# Patient Record
Sex: FEMALE | Employment: FULL TIME | URBAN - METROPOLITAN AREA
[De-identification: names, ages, dates, MRNs, and addresses within clinical notes are randomized per-mention and may not be internally consistent; named-entity substitution may affect disease eponyms.]

---

## 2023-10-27 PROBLEM — R87.610 PAP SMEAR ABNORMALITY OF CERVIX WITH ASCUS FAVORING BENIGN: Status: ACTIVE | Noted: 2023-10-27

## 2023-10-27 PROBLEM — N39.3 STRESS INCONTINENCE IN FEMALE: Status: ACTIVE | Noted: 2023-10-27

## 2023-10-27 PROBLEM — N76.0 VAGINITIS: Status: ACTIVE | Noted: 2023-10-27

## 2023-10-27 PROBLEM — N81.89 PELVIC FLOOR WEAKNESS: Status: ACTIVE | Noted: 2023-10-27

## 2023-10-27 PROBLEM — N93.9 ABNORMAL UTERINE BLEEDING: Status: ACTIVE | Noted: 2023-10-27

## 2023-10-27 PROBLEM — T38.4X5A: Status: ACTIVE | Noted: 2023-10-27

## 2023-10-27 PROBLEM — B36.0 PITYRIASIS VERSICOLOR: Status: ACTIVE | Noted: 2019-05-22

## 2023-10-27 PROBLEM — R10.2 PELVIC PAIN: Status: ACTIVE | Noted: 2023-10-27

## 2023-10-27 PROBLEM — O20.0 THREATENED ABORTION (HHS-HCC): Status: ACTIVE | Noted: 2023-10-27

## 2023-10-27 PROBLEM — R39.15 URINARY URGENCY: Status: ACTIVE | Noted: 2023-10-27

## 2023-10-27 PROBLEM — N92.0 HEAVY MENSTRUAL PERIOD: Status: ACTIVE | Noted: 2023-10-27

## 2023-10-27 RX ORDER — OXYBUTYNIN CHLORIDE 10 MG/1
1 TABLET, EXTENDED RELEASE ORAL 2 TIMES DAILY
COMMUNITY
Start: 2019-05-28 | End: 2023-10-30 | Stop reason: ALTCHOICE

## 2023-10-27 RX ORDER — KETOCONAZOLE 20 MG/ML
SHAMPOO, SUSPENSION TOPICAL
COMMUNITY
Start: 2019-01-17 | End: 2024-02-02

## 2023-10-27 RX ORDER — FLUCONAZOLE 150 MG/1
TABLET ORAL
COMMUNITY
Start: 2019-01-17 | End: 2024-02-02

## 2023-10-27 RX ORDER — PRENATAL WITH FERROUS FUM AND FOLIC ACID 3080; 920; 120; 400; 22; 1.84; 3; 20; 10; 1; 12; 200; 27; 25; 2 [IU]/1; [IU]/1; MG/1; [IU]/1; MG/1; MG/1; MG/1; MG/1; MG/1; MG/1; UG/1; MG/1; MG/1; MG/1; MG/1
1 TABLET ORAL DAILY
COMMUNITY
Start: 2022-10-24 | End: 2024-02-02

## 2023-10-27 RX ORDER — ONDANSETRON 4 MG/1
4 TABLET, FILM COATED ORAL 4 TIMES DAILY PRN
COMMUNITY
End: 2024-02-02

## 2023-10-29 NOTE — PROGRESS NOTES
"Subjective   Patient ID: Kristina Benitez is a 33 y.o. female who presents for stress urinary incontinence and worsening urinary frequency.    HPI   33-year-old presenting after mid urethral sling and cystoscopy 11/22/19 with a history of mixed urinary incontinence, pelvic floor weakness and pain with urinary urgency.    The patient was last seen in 2021, she presents with recurrence of her stress urinary incontinence. She has not been utilizing the Oxybutynin therapy after her miscarriages for the past year. She does note frequency and incontinence particularly after she stands up after voided. She voids every 1-2 hour during the day and notes at least once during the night. She also note leaking on laughing, coughing and sneezing. She denies any UTI like symptoms.    She denies any vaginal complaints, no abnormal bleeding or discharge.  She denies any dyspareunia complaints.    She denies any bowel related complaints, no fecal or flatal incontinence.    She has no other complaints.      From Previous note  31-year-old presenting for follow-up after mid urethral sling and cystoscopy 11/22/19.     Patient is extremely satisfied with her mid urethral sling. She denies any stress urinary incontinence complaints. She denies any urinary urgency. She has been sexually active and denies any dyspareunia. She does feel a \"bump\" where the previous incision was but denies any roughness, vaginal bleeding, or discomfort with her or her .     She has utilized her oxybutynin with improvements in her urinary urgency and frequency complaints. She does feel that when she takes 2 of her 10 mg tablets she has improved benefit. She wishes to increase her medication to 20 mg daily. She otherwise denies any constipation or dry mouth complaints.     She otherwise has no other complaints..        From previous note  29-year-old presenting as a referral from Dr. Dahl with complaints of mixed urinary incontinence.     Patient states " "that she has had at least a 5 year history of urinary complaints. She notes leaking with laughing, coughing, and sneezing both with a full an empty bladder. She states that she leaks \"splashes\". She also notes daytime urgency and frequency. She notes urinating every hour during the daytime and rare urge related incontinence with this. She notes nocturia 1-2 times and denies enuresis. She denies a history of nephrolithiasis, gross hematuria, or chronic urinary tract infections.     The patient is sexually active. She has the Nexplanon in place, which was placed in March 2019. She does wish to have more children. She denies any dyspareunia. She denies any abnormal vaginal bleeding or spotting. She denies any fecal or flatal incontinence. She has regular bowel movements.     She has utilized Kegel exercises in the past but is otherwise not been seen regarding these complaints.     She has no other complaints.     Review of Systems  Constitutional: No fever, No chills and No fatigue.   Eyes: No vision problems and No dryness of the eyes.   ENT: No dry mouth, No hearing loss and No nosebleeds.   Cardiovascular: No chest pain, No palpitations and No orthopnea.   Respiratory: No shortness of breath, No cough and No wheezing.   Gastrointestinal: No abdominal pain, No constipation, No nausea, No diarrhea, No vomiting and No melena.   Genitourinary: As noted in HPI.   Musculoskeletal: No back pain, No myalgias, No muscle weakness, No joint swelling and No leg edema.   Integumentary: No rashes, No skin lesion and No itching.   Neurological: No headache, No numbness and No dizziness.   Psychiatric: No sleep disturbances, No anxiety and No depression.   Endocrine: No hot flashes, No loss of hair and No hirsutism.   Hematologic/Lymphatic: No swollen glands, No tendency for easy bleeding and No tendency for easy bruising.   All other systems have been reviewed and are negative for complaint.        Objective   Physical " Exam    PHYSICAL EXAMINATION:  No LMP recorded.  There is no height or weight on file to calculate BMI.  There were no vitals taken for this visit.  General Appearance: well appearing  Neuro: Alert and oriented   HEENT: mucous membranes moist, neck supple  Resp: No respiratory distress, normal work of breathing  MSK: normal range of motion, gait appropriate      Assessment/Plan      33-year-old presenting after mid urethral sling and cystoscopy 11/22/19 with a history of mixed urinary incontinence, pelvic floor weakness and pain with urinary urgency.     #1 the patient is having excellent results with her mid urethral sling.  She denies any vaginal complaints.    #2 the patient had initially had excellent results with oxybutynin 20 mg daily.  However she has stopped this due to concerns for miscarriage.  We discussed a trial of Myrbetriq therapy now.  We discussed potential side effects of this and stopping immediately should you have any bothersome side effects.  We discussed proceeding with urodynamic testing thereafter should she not have resolution of her lower urinary tract symptoms.     #3 the patient will follow-up in 4 to 6 weeks to discuss her lower urinary tract symptoms.     RON Fuchs MD     Scribe Attestation  By signing my name below, IJeana Scribe attest that this documentation has been prepared under the direction and in the presence of Matteo Fuchs MD. All medical record entries made by the Scribe were at my direction or personally dictated by me. I have reviewed the chart and agree that the record accurately reflects my personal performance of the history, physical exam, discussion and plan.

## 2023-10-30 ENCOUNTER — OFFICE VISIT (OUTPATIENT)
Dept: UROLOGY | Facility: CLINIC | Age: 33
End: 2023-10-30
Payer: COMMERCIAL

## 2023-10-30 DIAGNOSIS — N39.41 URGE URINARY INCONTINENCE: Primary | ICD-10-CM

## 2023-10-30 DIAGNOSIS — N81.89 PELVIC FLOOR WEAKNESS: ICD-10-CM

## 2023-10-30 DIAGNOSIS — R39.15 URINARY URGENCY: ICD-10-CM

## 2023-10-30 DIAGNOSIS — N39.3 STRESS INCONTINENCE IN FEMALE: ICD-10-CM

## 2023-10-30 DIAGNOSIS — R10.2 PELVIC PAIN: ICD-10-CM

## 2023-10-30 LAB
POC APPEARANCE, URINE: CLEAR
POC BILIRUBIN, URINE: NEGATIVE
POC BLOOD, URINE: ABNORMAL
POC COLOR, URINE: YELLOW
POC GLUCOSE, URINE: NEGATIVE MG/DL
POC KETONES, URINE: NEGATIVE MG/DL
POC LEUKOCYTES, URINE: ABNORMAL
POC NITRITE,URINE: NEGATIVE
POC PH, URINE: 5.5 PH
POC PROTEIN, URINE: NEGATIVE MG/DL
POC SPECIFIC GRAVITY, URINE: 1.01
POC UROBILINOGEN, URINE: 0.2 EU/DL

## 2023-10-30 PROCEDURE — 99213 OFFICE O/P EST LOW 20 MIN: CPT | Performed by: OBSTETRICS & GYNECOLOGY

## 2023-10-30 PROCEDURE — 81003 URINALYSIS AUTO W/O SCOPE: CPT | Mod: QW | Performed by: OBSTETRICS & GYNECOLOGY

## 2023-10-30 PROCEDURE — 1036F TOBACCO NON-USER: CPT | Performed by: OBSTETRICS & GYNECOLOGY

## 2023-10-30 RX ORDER — MIRABEGRON 25 MG/1
25 TABLET, FILM COATED, EXTENDED RELEASE ORAL DAILY
Qty: 30 TABLET | Refills: 11 | Status: SHIPPED | OUTPATIENT
Start: 2023-10-30 | End: 2023-11-27 | Stop reason: SDUPTHER

## 2024-02-02 ENCOUNTER — OFFICE VISIT (OUTPATIENT)
Dept: OBSTETRICS AND GYNECOLOGY | Facility: CLINIC | Age: 34
End: 2024-02-02
Payer: COMMERCIAL

## 2024-02-02 VITALS
BODY MASS INDEX: 27.94 KG/M2 | SYSTOLIC BLOOD PRESSURE: 119 MMHG | DIASTOLIC BLOOD PRESSURE: 76 MMHG | WEIGHT: 148 LBS | HEIGHT: 61 IN

## 2024-02-02 DIAGNOSIS — Z01.419 ENCOUNTER FOR ANNUAL ROUTINE GYNECOLOGICAL EXAMINATION: Primary | ICD-10-CM

## 2024-02-02 DIAGNOSIS — N96 HISTORY OF RECURRENT MISCARRIAGES: ICD-10-CM

## 2024-02-02 LAB
POC BLOOD, URINE: NEGATIVE
POC GLUCOSE, URINE: NEGATIVE MG/DL
POC KETONES, URINE: NEGATIVE MG/DL
POC LEUKOCYTES, URINE: NEGATIVE
POC NITRITE,URINE: NEGATIVE
POC PROTEIN, URINE: NEGATIVE MG/DL

## 2024-02-02 PROCEDURE — 1036F TOBACCO NON-USER: CPT | Performed by: OBSTETRICS & GYNECOLOGY

## 2024-02-02 PROCEDURE — 99395 PREV VISIT EST AGE 18-39: CPT | Performed by: OBSTETRICS & GYNECOLOGY

## 2024-02-02 PROCEDURE — 81003 URINALYSIS AUTO W/O SCOPE: CPT | Mod: QW,91 | Performed by: OBSTETRICS & GYNECOLOGY

## 2024-02-02 PROCEDURE — 88175 CYTOPATH C/V AUTO FLUID REDO: CPT | Mod: TC,GCY,AHULAB,PORLAB | Performed by: OBSTETRICS & GYNECOLOGY

## 2024-02-02 PROCEDURE — 87624 HPV HI-RISK TYP POOLED RSLT: CPT | Performed by: OBSTETRICS & GYNECOLOGY

## 2024-02-02 ASSESSMENT — LIFESTYLE VARIABLES
HOW MANY STANDARD DRINKS CONTAINING ALCOHOL DO YOU HAVE ON A TYPICAL DAY: PATIENT DOES NOT DRINK
HOW OFTEN DO YOU HAVE A DRINK CONTAINING ALCOHOL: NEVER
HOW OFTEN DO YOU HAVE SIX OR MORE DRINKS ON ONE OCCASION: NEVER
AUDIT-C TOTAL SCORE: 0
SKIP TO QUESTIONS 9-10: 1

## 2024-02-02 ASSESSMENT — ENCOUNTER SYMPTOMS
LOSS OF SENSATION IN FEET: 0
PSYCHIATRIC NEGATIVE: 0
GASTROINTESTINAL NEGATIVE: 0
ENDOCRINE NEGATIVE: 0
MUSCULOSKELETAL NEGATIVE: 0
ALLERGIC/IMMUNOLOGIC NEGATIVE: 0
RESPIRATORY NEGATIVE: 0
HEMATOLOGIC/LYMPHATIC NEGATIVE: 0
OCCASIONAL FEELINGS OF UNSTEADINESS: 0
EYES NEGATIVE: 0
CARDIOVASCULAR NEGATIVE: 0
CONSTITUTIONAL NEGATIVE: 0
NEUROLOGICAL NEGATIVE: 0
DEPRESSION: 0

## 2024-02-02 ASSESSMENT — PAIN SCALES - GENERAL: PAINLEVEL: 0-NO PAIN

## 2024-02-02 ASSESSMENT — PATIENT HEALTH QUESTIONNAIRE - PHQ9
2. FEELING DOWN, DEPRESSED OR HOPELESS: NOT AT ALL
1. LITTLE INTEREST OR PLEASURE IN DOING THINGS: NOT AT ALL
SUM OF ALL RESPONSES TO PHQ9 QUESTIONS 1 & 2: 0

## 2024-02-02 NOTE — PROGRESS NOTES
Subjective   Patient ID: Kristina Benitez is a 34 y.o. female who presents for Annual Exam (Annual exam/wants to discuss miscarriages last pap 20 [facility] no chaperone needed).  MARCE Acevedo is a 34-year-old female  12 para 2 with 2 previous vaginal deliveries.  1 at 36 weeks and 1 at 38 weeks.  The second 1 with a cerclage due to suspected cervical incompetence.  She has had 1  in 2023.  Suction D&C  She states that she has multiple pregnancies with positive urine pregnancy test prior to her expected menses.  She feels as though she may have been pregnant 6 or 8 times in the last year.  All with vaginal bleeding coming is essentially on time of her menses.  She has urinary urgency and occasional incontinence.  Treated with Myrbetriq.  Had a previous urethral sling.    Last menstrual period was  and is expecting ovulation in the next 3 to 4 days.  Review of Systems   All other systems reviewed and are negative.      Objective   Physical Exam  Thyroid: No thyroid megaly    Cardiovascular: Regular rate and rhythm    Lungs: Clear to auscultation    Breasts: No skin changes no masses palpated    Abdomen: Soft nontender bowel sounds positive no masses palpated    Extremities nontender no edema    Pelvic exam: External genitalia Bartholin's urethra and [place]'s are normal.  Vaginal exam shows no lesions or discharge.  Pelvic bimanual exam reveals no masses or tenderness.  Assessment/Plan   Normal annual physical exam.  Cervix does feel patulous.  Consistent with her previous diagnosis of cervical incompetence.  Urinary urgency improved with Myrbetriq    Patient mostly concerned about recurrent miscarriages.  Positive urine pregnancy test with menses shortly thereafter.  I have ordered hCG, TSH, prolactin, and FSH.  I have asked her to get these drawn on the day of her positive urine pregnancy test.  Also ordered referral to reproductive endocrinology.  For recurrent miscarriage.    Pap  smear performed         Ernesto Dahl MD 02/02/24 11:19 AM

## 2024-05-28 ENCOUNTER — APPOINTMENT (OUTPATIENT)
Dept: UROLOGY | Facility: CLINIC | Age: 34
End: 2024-05-28
Payer: COMMERCIAL

## 2024-06-25 ENCOUNTER — APPOINTMENT (OUTPATIENT)
Dept: UROLOGY | Facility: CLINIC | Age: 34
End: 2024-06-25
Payer: COMMERCIAL

## 2024-11-29 DIAGNOSIS — N39.41 URGE URINARY INCONTINENCE: ICD-10-CM

## 2024-12-02 RX ORDER — MEDROXYPROGESTERONE ACETATE 150 MG/ML
1 INJECTION, SUSPENSION INTRAMUSCULAR
COMMUNITY
Start: 2013-02-05

## 2024-12-02 RX ORDER — ESCITALOPRAM OXALATE 10 MG/1
1 TABLET ORAL
COMMUNITY
Start: 2024-02-29

## 2024-12-02 RX ORDER — DEXTROAMPHETAMINE SACCHARATE, AMPHETAMINE ASPARTATE MONOHYDRATE, DEXTROAMPHETAMINE SULFATE AND AMPHETAMINE SULFATE 3.75; 3.75; 3.75; 3.75 MG/1; MG/1; MG/1; MG/1
CAPSULE, EXTENDED RELEASE ORAL
COMMUNITY
Start: 2024-11-22

## 2024-12-02 RX ORDER — MIRABEGRON 25 MG/1
25 TABLET, FILM COATED, EXTENDED RELEASE ORAL DAILY
Qty: 90 TABLET | Refills: 0 | Status: SHIPPED | OUTPATIENT
Start: 2024-12-02

## 2024-12-02 RX ORDER — PREDNISONE 20 MG/1
1 TABLET ORAL
COMMUNITY
Start: 2024-10-28

## 2024-12-02 RX ORDER — VARENICLINE TARTRATE 0.5 MG/1
TABLET, FILM COATED ORAL
COMMUNITY
Start: 2013-07-26

## 2024-12-02 RX ORDER — TRIAMCINOLONE ACETONIDE 1 MG/G
CREAM TOPICAL
COMMUNITY
Start: 2024-04-08

## 2025-04-24 NOTE — PROGRESS NOTES
[facility]  Outpatient Clinic Note    Patient Name:  Kristina Benitez  MRN:  [id number]  :  1990  Date of Service: 2025     Visit type: Follow up visit    HPI    Interval History:  Kristina Benitez is a 35 y.o. female who is being seen today for  problems listed below.     Problem list/Chief complaints:  Mixed urinary incontinence - s/p urethral sling and cystoscopy 19, Taking Myrbetriq 25 mg      23: Visit with Dr. Fuchs.  Kristina Benitez is a 33 y.o. female who presents for stress urinary incontinence and worsening urinary frequency.     HPI  This visit was performed through telemedicine  33-year-old presenting after mid urethral sling and cystoscopy 19 with a history of mixed urinary incontinence, pelvic floor weakness and pain with urinary urgency.     The patient has been utilizing the Myrbetriq therapy with some improvements, she now notes rare 1 episode of nocturia and denies any enuresis. She voids every 3-5 hours during the day and had rare 1-2 episodes of incontinence. She denies any UTI like symptoms.     She denies any vaginal complaints, no abnormal bleeding or discharge.      She denies any bowel related complaints, no fecal or flatal incontinence.     She has no other complaints.      25: Patient presents for follow up. She has been taking Myrbetriq and it works very well for urge incontinence. She recently ran out of medication and the urge urinary incontinence returned. She has occasional burning with urination if she holds urine for too long, no hematuria. No stress incontince since sling was placed.     Medical History[1]    Surgical History[2]    Social History     Socioeconomic History    Marital status: Single     Spouse name: Not on file    Number of children: Not on file    Years of education: Not on file    Highest education level: Not on file   Occupational History    Not on file   Tobacco Use    Smoking status: Never     Passive exposure:  Never    Smokeless tobacco: Never   Substance and Sexual Activity    Alcohol use: Not on file    Drug use: Not on file    Sexual activity: Not on file   Other Topics Concern    Not on file   Social History Narrative    Not on file     Social Drivers of Health     Financial Resource Strain: Not on file   Food Insecurity: Not on file   Transportation Needs: Not on file   Physical Activity: Not on file   Stress: No Stress Concern Present (2/2/2024)    [employer] - Occupational Stress Questionnaire     Feeling of Stress : Not at all   Social Connections: Not on file   Intimate Partner Violence: Not on file   Housing Stability: Not on file       Allergies[3]     Current Medications[4]     Review of system:  All other systems have been reviewed and are negative for complaints      Last recorded vitals:  There were no vitals taken for this visit.    Physical Exam:  General: Appears comfortable and in no apparent distress.  Head: Normocephalic, atraumatic  Eyes: Non-injected conjunctiva, sclera clear, no proptosis  Lungs: Breathing is easy, non-labored. Speaking in clear and complete sentences. Normal diaphragmatic movement.  Cardiovascular: no peripheral edema, cyanosis or pallor.   Abdomen: soft, non-distended, non-tender  : Bladder: non tender, not distended  MSK: Ambulatory with steady gait, unassisted  Skin: No visible rashes or lesions  Neurologic: Alert, oriented to person, place, and time  Psychiatric: mood and affect appropriate    Assessment and Plan:  Kristina Benitez is a 35 y.o. female with history of mixed urinary incontinence, who presents for follow up.     Plan:  -Discussed the risks and benefit of continuing Myrbetriq, medication is working well for patient with no side effects. Discussed other management options. The patient and I agreed to continue with medication. Refill sent to pharmacy  -Referral placed for Pelvic floor physical therapy to help with urge  incontinence  -Patient prefers to wait for Dr. Fuchs's return for pelvic exam  -Follow-up in 1 year, or sooner if needed, to reassess symptoms and for medication refill.    All questions and concerns were addressed. Patient verbalizes understanding and has no other questions at this time.     Some elements copied from Dr. Fuchs's note on 11/27/23, the elements have been updated and all reflect current decision making from today, 04/25/25    E&M visit today is associated with current or anticipated ongoing medical care services related to a patient's single, serious condition or a complex condition.    ROQUE Richards-CNP   [facility]  04/25/25 12:17 PM         [1]   Past Medical History:  Diagnosis Date    Encounter for follow-up examination after completed treatment for conditions other than malignant neoplasm 07/30/2021    Postop check    Encounter for pregnancy test, result negative 05/14/2020    Negative pregnancy test    Encounter for pregnancy test, result unknown 01/12/2017    Encounter for confirmation of pregnancy test result with physical examination    Encounter for supervision of normal pregnancy, unspecified, second trimester 06/05/2017    Pregnancy, obstetrical care, second trimester    Encounter for supervision of normal pregnancy, unspecified, third trimester 07/21/2017    Pregnancy, obstetrical care, third trimester    Other conditions influencing health status     History of pregnancy    Other specified health status     Last menstrual period (LMP) > 10 days ago    Personal history of other complications of pregnancy, childbirth and the puerperium     History of miscarriage   [2]   Past Surgical History:  Procedure Laterality Date    OTHER SURGICAL HISTORY  12/04/2019    Mid-urethral sling insertion    OTHER SURGICAL HISTORY  05/28/2019    Cervical cerclage placement   [3] No Known Allergies  [4]   Current Outpatient Medications:     amphetamine-dextroamphetamine XR (Adderall  XR) 15 mg 24 hr capsule, TAKE 1 CAPSULE BY MOUTH EVERY DAY FOR 14 DAYS, Disp: , Rfl:     escitalopram (Lexapro) 10 mg tablet, Take 1 tablet (10 mg) by mouth early in the morning.., Disp: , Rfl:     medroxyPROGESTERone 150 mg/mL injection, Inject 1 mL (150 mg) into the muscle every 3 months., Disp: , Rfl:     mirabegron (Myrbetriq) 25 mg tablet extended release 24 hr, Take 1 tablet (25 mg) by mouth once daily., Disp: 90 tablet, Rfl: 3    predniSONE (Deltasone) 20 mg tablet, Take 1 tablet (20 mg) by mouth every 12 hours., Disp: , Rfl:     triamcinolone (Kenalog) 0.1 % cream, Apply twice daily to affected areas on body up to 2 weeks then decrease to as needed. HOLD when clear. AVOID face and skin folds. (topical s, Disp: , Rfl:     varenicline (Chantix) 0.5 mg tablet, Take 1 tab daily on day 1-3, then take 1 tab twice daily on day 4-7, then as instructed., Disp: , Rfl:

## 2025-04-25 ENCOUNTER — OFFICE VISIT (OUTPATIENT)
Dept: UROLOGY | Facility: HOSPITAL | Age: 35
End: 2025-04-25
Payer: COMMERCIAL

## 2025-04-25 ENCOUNTER — TELEPHONE (OUTPATIENT)
Dept: UROLOGY | Facility: HOSPITAL | Age: 35
End: 2025-04-25
Payer: COMMERCIAL

## 2025-04-25 DIAGNOSIS — N39.46 MIXED STRESS AND URGE URINARY INCONTINENCE: Primary | ICD-10-CM

## 2025-04-25 DIAGNOSIS — N81.89 PELVIC FLOOR WEAKNESS: ICD-10-CM

## 2025-04-25 PROCEDURE — 1036F TOBACCO NON-USER: CPT | Performed by: NURSE PRACTITIONER

## 2025-04-25 PROCEDURE — 99214 OFFICE O/P EST MOD 30 MIN: CPT | Performed by: NURSE PRACTITIONER

## 2025-04-25 RX ORDER — MIRABEGRON 25 MG/1
25 TABLET, FILM COATED, EXTENDED RELEASE ORAL DAILY
Qty: 90 TABLET | Refills: 3 | Status: SHIPPED | OUTPATIENT
Start: 2025-04-25 | End: 2026-04-25

## 2025-04-25 NOTE — TELEPHONE ENCOUNTER
Per Araseli Kaur, if patient requires a pelvic exam she must reschedule with Dr Holt or Dr Millard.  Direct number [phone] for pt to call